# Patient Record
Sex: MALE | Race: WHITE | Employment: OTHER | URBAN - METROPOLITAN AREA
[De-identification: names, ages, dates, MRNs, and addresses within clinical notes are randomized per-mention and may not be internally consistent; named-entity substitution may affect disease eponyms.]

---

## 2023-09-12 ENCOUNTER — PROCEDURE VISIT (OUTPATIENT)
Dept: NEUROLOGY | Age: 68
End: 2023-09-12

## 2023-09-12 VITALS — BODY MASS INDEX: 38.49 KG/M2 | HEIGHT: 68 IN | OXYGEN SATURATION: 95 % | HEART RATE: 74 BPM | WEIGHT: 254 LBS

## 2023-09-12 DIAGNOSIS — G56.01 RIGHT CARPAL TUNNEL SYNDROME: ICD-10-CM

## 2023-09-12 DIAGNOSIS — R20.0 NUMBNESS AND TINGLING IN RIGHT HAND: ICD-10-CM

## 2023-09-12 DIAGNOSIS — G56.21 CUBITAL TUNNEL SYNDROME ON RIGHT: Primary | ICD-10-CM

## 2023-09-12 DIAGNOSIS — R20.2 NUMBNESS AND TINGLING IN RIGHT HAND: ICD-10-CM

## 2023-09-12 NOTE — PROGRESS NOTES
EMG/Nerve Conduction Study Procedure Note  [address] [place] [address], [address] [place] [address] Rd,[address]   [phone]      Hx:    Exam:     77 y.o.    RH M W  male  with paresthesia RUE //  EMG poss CTS. Etc. Paresthesia hands. To also 4/5 digits  ? Ulnar neurop. Hx DM. A1C 6.2. Ref[de-identified]   Dr Humberto Perrin  Tech:  [employer] 5'8\"         Summary               needle EMG of select RUE mm . Tomie Belt Controlled environmental factors / EMG lab. Temperature. NCV : sensory segments:     Abnormal = slowed SCV right median nerve with attenuated SNAP at right median and ulnar nerves. Normal right radial SCV. NCV transcarpal sensory segments:     Abnormal slowed right median transcarpal SCV with normal transcarpal ulnar SCV and prolonged peak Diff latencies at 0.85 msec (UL = 0.20 msec ). NCV Motor MCV segments:     Abnormal right median = prolonged TL at 4.58 msec (UL = 4.15 msec). Including attenuation of the median nerve CMAP. And  mild Attenuated CMAP of the right median nerve. .  and Abnormal slowed right ulnar at Elbow to ADM MCV and slight slowed ulnar to FDI MCV segments. F-wave studies:         Normal right median and ulnar F-waves. NEEDLE EMG:   Tested muscles[de-identified]    R FCU FDI APB ADM mm tested =  Abnormal APB = 1+ 1+ 1+  IA Fib PSW w RIP reduced recruitment. R ADM also RIP without fib/sharps. FCU and FDI wnl. INTERPRETATION:     ELECTROPHYSIOLOGIC FINDINGS of   MODERATE  ENTRAPMENT OF THE MEDIAN NERVE AT THE WRIST AND MILD ULNAR NEUROPATHY ENTRAPMENT AT THE ELBOW. No myopathy myotonia or fasciculation. No other neuropathy identified. CONCLUSION:      Compatible with mild right ulnar neuropathy or entrapment neuropathy at the elbow; mild moderate  right carpal tunnel syndrome. Procedure Details:       Correlates with clinical examination and history moderate RIGHT carpal tunnel syndrome with MILD RIGHT ulnar neuropathy at the elbow. Patient is made fully aware.